# Patient Record
Sex: FEMALE | Race: WHITE | NOT HISPANIC OR LATINO | Employment: UNEMPLOYED | URBAN - METROPOLITAN AREA
[De-identification: names, ages, dates, MRNs, and addresses within clinical notes are randomized per-mention and may not be internally consistent; named-entity substitution may affect disease eponyms.]

---

## 2017-02-17 ENCOUNTER — OFFICE VISIT (OUTPATIENT)
Dept: OBSTETRICS AND GYNECOLOGY | Facility: CLINIC | Age: 32
End: 2017-02-17

## 2017-02-17 VITALS
SYSTOLIC BLOOD PRESSURE: 129 MMHG | HEIGHT: 64 IN | WEIGHT: 264.8 LBS | HEART RATE: 89 BPM | BODY MASS INDEX: 45.21 KG/M2 | DIASTOLIC BLOOD PRESSURE: 89 MMHG

## 2017-02-17 DIAGNOSIS — Z12.4 PAP SMEAR FOR CERVICAL CANCER SCREENING: Primary | ICD-10-CM

## 2017-02-17 DIAGNOSIS — Z11.3 SCREENING FOR STDS (SEXUALLY TRANSMITTED DISEASES): ICD-10-CM

## 2017-02-17 DIAGNOSIS — N92.6 IRREGULAR BLEEDING: ICD-10-CM

## 2017-02-17 PROCEDURE — 99385 PREV VISIT NEW AGE 18-39: CPT | Performed by: OBSTETRICS & GYNECOLOGY

## 2017-02-17 NOTE — PROGRESS NOTES
Debby Bartlett is a 31 y.o. female  0  Last annual 10y, last pap 10y( abnormal with negative biopsy and no follow-up),   Cc: Annual exam    History of Present Illness  She has been having irregular bleeding over the past month.  He is currently on no type of contraception (had been trying for approximately 5 years but without success)  The following portions of the patient's history were reviewed and updated as appropriate: allergies, current medications, past family history, past medical history, past social history, past surgical history and problem list.    Review of Systems   Genitourinary: Positive for menstrual problem.        Infertility   Psychiatric/Behavioral:        Situational depression   All other systems reviewed and are negative.        Past Medical History   Diagnosis Date   • Abnormal Pap smear of cervix    • Chlamydia    • Kidney stones      Menstrual History:  OB History     No data available         Menarche age:12  No LMP recorded.  Period Cycle (Days): 28  Period Duration (Days): 2  Period Pattern: Regular  Menstrual Flow: Moderate  Menstrual Control: Tampon  Menstrual Control Change Freq (Hours): 4  Dysmenorrhea: (!) Mild  Dysmenorrhea Symptoms: Cramping    Past Surgical History   Procedure Laterality Date   • Cystoscopy w/ ureteral stent placement Right 2016     Procedure: RIGHT CYSTO STENT ;  Surgeon: Adalberto Agee MD;  Location: [facility] OR;  Service:    • [place] tooth extraction       OB History     No data available        Family History   Problem Relation Age of Onset   • Endometrial cancer Mother 46   • Breast cancer Maternal Grandfather      History   Smoking Status   • Former Smoker   • Years: 2.00   • Types: Cigarettes   • Quit date: 2013   Smokeless Tobacco   • Former User   • Quit date:      Comment: Minimal smoking     History   Alcohol Use   • Yes     Comment: Past history     Health Maintenance   Topic Date Due   • TDAP/TD VACCINES (1 -  "Tdap) 08/28/2005   • INFLUENZA VACCINE  08/01/2016     No current outpatient prescriptions on file.  Sexual History:  Sexually Transmitted Infection History: Chlamydia         Objective   Vitals:    02/17/17 0916   BP: 129/89   Pulse: 89   Weight: 264 lb 12.8 oz (120 kg)   Height: 64\" (162.6 cm)     Physical Exam   Constitutional: She is oriented to person, place, and time. She appears well-developed and well-nourished.   HENT:   Head: Normocephalic.   Eyes: Pupils are equal, round, and reactive to light.   Neck: Normal range of motion. No thyromegaly present.   Cardiovascular: Normal rate, regular rhythm, normal heart sounds and intact distal pulses.    Pulmonary/Chest: Effort normal and breath sounds normal. No respiratory distress. She exhibits no tenderness. Right breast exhibits no inverted nipple, no mass, no nipple discharge, no skin change and no tenderness. Left breast exhibits no inverted nipple, no mass, no nipple discharge, no skin change and no tenderness. Breasts are symmetrical.   Abdominal: Soft. Bowel sounds are normal. Hernia confirmed negative in the right inguinal area and confirmed negative in the left inguinal area.   Genitourinary: Rectum normal, vagina normal and uterus normal. Rectal exam shows no external hemorrhoid, no internal hemorrhoid, no fissure, no mass, no tenderness and anal tone normal. No breast tenderness or discharge. Pelvic exam was performed with patient supine. There is no rash, tenderness, lesion or injury on the right labia. There is no rash, tenderness, lesion or injury on the left labia. Uterus is not enlarged and not tender. Cervix exhibits no motion tenderness, no discharge and no friability. Right adnexum displays no mass, no tenderness and no fullness. Left adnexum displays no mass, no tenderness and no fullness.   Genitourinary Comments: Small amount of vaginal bleeding.  Difficult to examine uterus due to body habitus   Lymphadenopathy:     She has no cervical " adenopathy.        Right: No inguinal adenopathy present.        Left: No inguinal adenopathy present.   Neurological: She is alert and oriented to person, place, and time. She has normal reflexes.   Skin: Skin is warm and dry.   Psychiatric: She has a normal mood and affect. Her behavior is normal. Judgment and thought content normal.         Assessment/Plan   Amanda was seen today for gynecologic exam.    Diagnoses and all orders for this visit:    Pap smear for cervical cancer screening  -     IGP, Aptima HPV, Rfx 16 / 18,45    Screening for STDs (sexually transmitted diseases)  -     NuSwab BV & Candida, ANA    Irregular bleeding  Comments:  In view of early-onset endometrial cancer in her mother and anxiety and patient will schedule ultrasound was same-day appointment for possible biopsy

## 2017-02-20 ENCOUNTER — OFFICE VISIT (OUTPATIENT)
Dept: OBSTETRICS AND GYNECOLOGY | Facility: CLINIC | Age: 32
End: 2017-02-20

## 2017-02-20 ENCOUNTER — PROCEDURE VISIT (OUTPATIENT)
Dept: OBSTETRICS AND GYNECOLOGY | Facility: CLINIC | Age: 32
End: 2017-02-20

## 2017-02-20 VITALS
WEIGHT: 263.4 LBS | SYSTOLIC BLOOD PRESSURE: 126 MMHG | HEIGHT: 64 IN | HEART RATE: 78 BPM | BODY MASS INDEX: 44.97 KG/M2 | DIASTOLIC BLOOD PRESSURE: 91 MMHG

## 2017-02-20 DIAGNOSIS — N92.6 IRREGULAR BLEEDING: Primary | ICD-10-CM

## 2017-02-20 LAB
B-HCG UR QL: NEGATIVE
INTERNAL NEGATIVE CONTROL: NEGATIVE
INTERNAL POSITIVE CONTROL: POSITIVE
Lab: NORMAL

## 2017-02-20 PROCEDURE — 76830 TRANSVAGINAL US NON-OB: CPT | Performed by: OBSTETRICS & GYNECOLOGY

## 2017-02-20 PROCEDURE — 58100 BIOPSY OF UTERUS LINING: CPT | Performed by: OBSTETRICS & GYNECOLOGY

## 2017-02-20 PROCEDURE — 81025 URINE PREGNANCY TEST: CPT | Performed by: OBSTETRICS & GYNECOLOGY

## 2017-02-20 NOTE — PROGRESS NOTES
Procedure    Ultrasound showed some cystic changes in the lower uterine segment with a normal upper uterine segment.  Cystic changes were not consistent with vessels as there was no blood flow.  An endometrial biopsy will be performed    Endometrial Biopsy Procedure Note    Pre-operative Diagnosis: Abnormal ultrasound    Post-operative Diagnosis: same    Indications: abnormal uterine bleeding    Procedure Details    Urine pregnancy test was not done.  The risks (including infection, bleeding, pain, and uterine perforation) and benefits of the procedure were explained to the patient and Verbal informed consent was obtained.  Antibiotic prophylaxis against endocarditis was not indicated.     The patient was placed in the dorsal lithotomy position.  Bimanual exam showed the uterus to be in the anteroflexed position.  A Graves' speculum inserted in the vagina, and the cervix prepped with povidone iodine.  Endocervical curettage with a Kevorkian curette was not performed.     A sharp tenaculum was applied to the lateral lip of the cervix for stabilization.  A sterile uterine sound was used to sound the uterus to a depth of 7cm.  A Pipelle endometrial aspirator was used to sample the endometrium.  Sample was sent for pathologic examination.  Specimen was predominantly from the lower uterine segment    Condition:  Stable    Complications:  None    Plan:    The patient was advised to call for any fever or for prolonged or severe pain or bleeding. She was advised to use OTC analgesics as needed for mild to moderate pain. She was advised to avoid vaginal intercourse for 48 hours or until the bleeding has completely stopped.    Attending Physician Documentation:  I was present for or participated in the entire procedure, including opening and closing.   Procedures

## 2017-02-21 LAB
CYTOLOGIST CVX/VAG CYTO: NORMAL
CYTOLOGY CVX/VAG DOC THIN PREP: NORMAL
DX ICD CODE: NORMAL
HIV 1 & 2 AB SER-IMP: NORMAL
HPV I/H RISK 4 DNA CVX QL PROBE+SIG AMP: NEGATIVE
OTHER STN SPEC: NORMAL
PATH REPORT.FINAL DX SPEC: NORMAL
STAT OF ADQ CVX/VAG CYTO-IMP: NORMAL

## 2017-02-22 LAB
A VAGINAE DNA VAG QL NAA+PROBE: NORMAL SCORE
BVAB2 DNA VAG QL NAA+PROBE: NORMAL SCORE
C ALBICANS DNA VAG QL NAA+PROBE: NEGATIVE
C GLABRATA DNA VAG QL NAA+PROBE: NEGATIVE
MEGA1 DNA VAG QL NAA+PROBE: NORMAL SCORE

## 2017-02-23 LAB
DX ICD CODE: NORMAL
DX ICD CODE: NORMAL
PATH REPORT.FINAL DX SPEC: NORMAL
PATH REPORT.GROSS SPEC: NORMAL
PATH REPORT.RELEVANT HX SPEC: NORMAL
PATH REPORT.SITE OF ORIGIN SPEC: NORMAL
PATHOLOGIST NAME: NORMAL
PAYMENT PROCEDURE: NORMAL

## 2017-02-27 ENCOUNTER — OFFICE VISIT (OUTPATIENT)
Dept: OBSTETRICS AND GYNECOLOGY | Facility: CLINIC | Age: 32
End: 2017-02-27

## 2017-02-27 VITALS
SYSTOLIC BLOOD PRESSURE: 132 MMHG | HEIGHT: 64 IN | WEIGHT: 266.4 LBS | HEART RATE: 70 BPM | DIASTOLIC BLOOD PRESSURE: 87 MMHG | BODY MASS INDEX: 45.48 KG/M2

## 2017-02-27 DIAGNOSIS — N92.6 IRREGULAR BLEEDING: Primary | ICD-10-CM

## 2017-02-27 PROCEDURE — 99213 OFFICE O/P EST LOW 20 MIN: CPT | Performed by: OBSTETRICS & GYNECOLOGY

## 2017-02-27 NOTE — PROGRESS NOTES
Subjective   Amanda Bartlett is a 31 y.o. female.   Cc: Follow-up endometrial biopsy  History of Present Illness  Pathology showed proliferative endometrium somewhat disordered without evidence of hyperplasia or atypia  The following portions of the patient's history were reviewed and updated as appropriate: allergies, current medications, past family history, past medical history, past social history, past surgical history and problem list.    Review of Systems   Genitourinary: Positive for menstrual problem.   All other systems reviewed and are negative.      Objective   Physical Exam   Constitutional: She is oriented to person, place, and time. She appears well-developed and well-nourished.   Neurological: She is alert and oriented to person, place, and time.   Skin: Skin is warm and dry.   Psychiatric: She has a normal mood and affect. Her behavior is normal. Judgment and thought content normal.   Vitals reviewed.      Assessment/Plan   Amanda was seen today for follow-up.    Diagnoses and all orders for this visit:    Irregular bleeding  Comments:  We will try to regulate with birth control pills.  Repeat ultrasound in 3 months    Other orders  -     norgestrel-ethinyl estradiol (LOW-OGESTREL) 0.3-30 MG-MCG per tablet; Take 1 tablet by mouth Daily.     time with patient discussing findings and pathology and plans approximately 15 minutes